# Patient Record
Sex: FEMALE | Race: BLACK OR AFRICAN AMERICAN
[De-identification: names, ages, dates, MRNs, and addresses within clinical notes are randomized per-mention and may not be internally consistent; named-entity substitution may affect disease eponyms.]

---

## 2020-07-02 ENCOUNTER — HOSPITAL ENCOUNTER (OUTPATIENT)
Dept: HOSPITAL 35 - ER | Age: 55
Setting detail: OBSERVATION
Discharge: HOME | End: 2020-07-02
Attending: INTERNAL MEDICINE | Admitting: INTERNAL MEDICINE
Payer: COMMERCIAL

## 2020-07-02 VITALS — DIASTOLIC BLOOD PRESSURE: 68 MMHG | SYSTOLIC BLOOD PRESSURE: 98 MMHG

## 2020-07-02 VITALS — SYSTOLIC BLOOD PRESSURE: 112 MMHG | DIASTOLIC BLOOD PRESSURE: 68 MMHG

## 2020-07-02 VITALS — SYSTOLIC BLOOD PRESSURE: 135 MMHG | DIASTOLIC BLOOD PRESSURE: 72 MMHG

## 2020-07-02 VITALS — SYSTOLIC BLOOD PRESSURE: 115 MMHG | DIASTOLIC BLOOD PRESSURE: 74 MMHG

## 2020-07-02 VITALS — SYSTOLIC BLOOD PRESSURE: 121 MMHG | DIASTOLIC BLOOD PRESSURE: 81 MMHG

## 2020-07-02 VITALS — SYSTOLIC BLOOD PRESSURE: 98 MMHG | DIASTOLIC BLOOD PRESSURE: 68 MMHG

## 2020-07-02 VITALS — BODY MASS INDEX: 38.27 KG/M2 | HEIGHT: 62.99 IN | WEIGHT: 216 LBS

## 2020-07-02 VITALS — SYSTOLIC BLOOD PRESSURE: 129 MMHG | DIASTOLIC BLOOD PRESSURE: 64 MMHG

## 2020-07-02 VITALS — DIASTOLIC BLOOD PRESSURE: 91 MMHG | SYSTOLIC BLOOD PRESSURE: 133 MMHG

## 2020-07-02 VITALS — DIASTOLIC BLOOD PRESSURE: 74 MMHG | SYSTOLIC BLOOD PRESSURE: 110 MMHG

## 2020-07-02 DIAGNOSIS — E78.00: ICD-10-CM

## 2020-07-02 DIAGNOSIS — E78.5: ICD-10-CM

## 2020-07-02 DIAGNOSIS — I10: ICD-10-CM

## 2020-07-02 DIAGNOSIS — I21.4: Primary | ICD-10-CM

## 2020-07-02 DIAGNOSIS — J45.909: ICD-10-CM

## 2020-07-02 DIAGNOSIS — I25.110: ICD-10-CM

## 2020-07-02 DIAGNOSIS — F17.290: ICD-10-CM

## 2020-07-02 LAB
ANION GAP SERPL CALC-SCNC: 8 MMOL/L (ref 7–16)
BASOPHILS NFR BLD AUTO: 0.6 % (ref 0–2)
BUN SERPL-MCNC: 12 MG/DL (ref 7–18)
CALCIUM SERPL-MCNC: 9.2 MG/DL (ref 8.5–10.1)
CHLORIDE SERPL-SCNC: 104 MMOL/L (ref 98–107)
CHOLEST SERPL-MCNC: 100 MG/DL (ref ?–200)
CO2 SERPL-SCNC: 27 MMOL/L (ref 21–32)
CREAT SERPL-MCNC: 1.1 MG/DL (ref 0.6–1)
EOSINOPHIL NFR BLD: 15.3 % (ref 0–3)
ERYTHROCYTE [DISTWIDTH] IN BLOOD BY AUTOMATED COUNT: 14.2 % (ref 10.5–14.5)
GLUCOSE SERPL-MCNC: 99 MG/DL (ref 74–106)
GRANULOCYTES NFR BLD MANUAL: 50.9 % (ref 36–66)
HCT VFR BLD CALC: 39.1 % (ref 37–47)
HDLC SERPL-MCNC: 41 MG/DL (ref 40–?)
HGB BLD-MCNC: 13.4 GM/DL (ref 12–15)
LDLC SERPL-MCNC: 43 MG/DL (ref ?–100)
LYMPHOCYTES NFR BLD AUTO: 27.1 % (ref 24–44)
MCH RBC QN AUTO: 31.5 PG (ref 26–34)
MCHC RBC AUTO-ENTMCNC: 34.2 G/DL (ref 28–37)
MCV RBC: 92.1 FL (ref 80–100)
MONOCYTES NFR BLD: 6.1 % (ref 1–8)
NEUTROPHILS # BLD: 3.8 THOU/UL (ref 1.4–8.2)
PLATELET # BLD: 225 THOU/UL (ref 150–400)
POTASSIUM SERPL-SCNC: 3.2 MMOL/L (ref 3.5–5.1)
RBC # BLD AUTO: 4.25 MIL/UL (ref 4.2–5)
SODIUM SERPL-SCNC: 139 MMOL/L (ref 136–145)
TC:HDL: 2.4 RATIO
TRIGL SERPL-MCNC: 82 MG/DL (ref ?–150)
TROPONIN I SERPL-MCNC: <0.06 NG/ML (ref ?–0.06)
VLDLC SERPL CALC-MCNC: 16 MG/DL (ref ?–40)
WBC # BLD AUTO: 7.6 THOU/UL (ref 4–11)
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
## 2020-07-02 NOTE — NUR
ASSUMED PT CARE AT 1900, PT IS AWAKE, A&OX4, SR ON THE MONITOR, DENIES CHEST
PAIN, DULL PAIN ON THE GROIN SITE TO TOUCH, GROIN SITE WITH NO HEMATOMA,
PULSES 2+ BLE, AMBULATED ON THE HALLWAY, NO SOA, LIGHTHEADEDNESS OR HEADACHE,
PT WILL BE D/C HOME WITH SON, DISCHARGE EDUCATION GIVEN, PT STATES
UNDERSTANDING

## 2020-07-06 NOTE — CATHLAB
[facility]
[address]
[place]   [address]                   INVASIVE PROCEDURE REPORT     
_______________________________________________________________________________
 
Name:       DARRELL CUMMINGS           Room #:         212-P       [facility] Robby BERGER#:      [id number]                       Account #:      [id number]  
Admission:  07/02/20    Attend Phys:    Ishan Braun MD,
Discharge:  07/02/20    Date of Birth:  [birth year]  
                                                          Report #: [id number]
                                                                    [id number]
_______________________________________________________________________________
THIS REPORT FOR:  
 
cc:  Don Mercado MD MPH    
     Don Mercado MD MPH    
     Ishan Braun MD [facility]                                        
                                                                       ~
 
--------------- APPROVED REPORT --------------
 
 
Study performed:  07/02/2020 15:45:28
 
Patient Details
Patient Status: Out-Patient                  Room #: 
The patient is a 54 year-old female
 
Event Personnel
Ishan Braun  Interventional Cardiologist, Any Cote RN RN, 
Marysol Silva  Monitor, Shruthi Dupont RTR Scrub
 
Procedures Performed
Art Access - R femoral artery*  78252 Initial Mod Sed Same Phys/QHP 
Gr5y 841937 Left Heart Cath w/or w/o Coronaries [id number] [facility] Aortogram 
Abdominal Peripheral Angio 163711 Hemostasis w/ 
Mynx
 
Indication
Non-STEMI 
 
Procedure Narrative
The patient was brought urgently to the Cardiac Catheterization 
Laboratory and was prepped and draped in a sterile manner. The Right 
Groin^ was infiltrated with 1% Lidocaine subcutaneous anesthesia. A 
PINNACLE 6FR Sheath #[id number] sheath was inserted into the RFA^. 
Coronary angiography was performed using coronary diagnostic 
catheters. The right coronary system was accessed and visualized with 
a JR 4 catheter. The left coronary system was accessed and visualized 
with a JL 4 catheter. The left ventricle was accessed and visualized 
with a Pigtail catheter. Left ventriculogram was performed in TAN 
projection. An aortogram of the abdominal aorta was performed. 
Closure device was deployed with a 6 Fr Mynx. The patient tolerated 
the procedure well and there were no complications associated with 
the procedure. There was no hematoma.
 
Intraoperative Conscious Sedation
Sedation start time:  16:41           Case end Time:  
 
 
[facility]
[place]
[place]  [address]
Phone:  [phone]                    INVASIVE PROCEDURE REPORT     
_______________________________________________________________________________
 
Name:            DARRELL CUMMINGS           Room #:        212-P       [facility] [place]
M.R.#:           [id number]          Account #:     [id number]  
Admission:       07/02/20         Attend Phys:   Ishan Braun,
Discharge:    07/02/20      Date of Birth: [birth year]  
                         Report #:      [id number]
        [id number]
_______________________________________________________________________________
17:02    
Fentanyl  100 mcg    Versed  2 mg  
 
Fluoro Time:    1.30 minutes    
Dose:     DAP 3462.00 cGycm2  390 mGy  
Contrast Type and Amount:  Visipaque 105 ml   
 
Hemodynamics
The aortic pressure is 115/74 mmHg with a mean of 89 mmHg. 
 
Conclusion
1.  Left main free of disease giving rise to LAD and circumflex
#2 LAD extends around the apex no occlusive disease
#3 circumflex OM nondominant but moderate distribution no high-grade 
disease
#4 dominant right coronary no occlusive disease widely patent
#5 normal left ventricular size systolic function lower limits of 
normal EF 50% range
#6 abdominal aorta is normal in caliber no aneurysm.
 
Recommendations and plan: Continue aggressive risk factor 
modification.  I explained to the patient she is essentially normal 
coronary anatomy thus etiology of chest pain appears to be 
noncardiac.
 
 
 
 
 
 
 
 
 
 
 
 
 
 
 
 
 
 
 
 
  <ELECTRONICALLY SIGNED>
   By: [facility]an [facility], [facility]   
  07/06/20 1826
D: 07/06/20 1826                           _____________________________________
T: 07/06/20 1826                           Ishna Braun MD, FACC     /INF

## 2020-07-06 NOTE — EKG
[facility]
[address]
[place]   [address]                     ELECTROCARDIOGRAM REPORT      
_______________________________________________________________________________
 
Name:       DARRELL CUMMINGS           Room #:         [facility] 
M.R.#:      [id number]                       Account #:      [id number]  
Admission:  20    Attend Phys:    Ishan Braun MD,
Discharge:  20    Date of Birth:  [birth year]  
                                                          Report #: [id number]
                                                                    [id number]
_______________________________________________________________________________
THIS REPORT FOR:  
 
cc:  Don Mercado MD MPH    
     Don Mercado MD MPH    
     Inocente Antonio MD [facility]                                        ~
THIS REPORT FOR:   //name//                          
 
                         [facility] ED
                                       
Test Date:    2020               Test Time:    13:18:35
Pat Name:     DARRELL LAZAR      Department:   
Patient ID:   [id number]            Room:         [facility]
Gender:       F                        Technician:   ESHEETS
:          1965               Requested By: Mick Salazar
Order Number: [id number] MD:   Inocente Antonio
                                 Measurements
Intervals                              Axis          
Rate:         60                       P:            35
MS:           179                      QRS:          -23
QRSD:         102                      T:            1
QT:           440                                    
QTc:          440                                    
                           Interpretive Statements
Sinus rhythm
Left ventricular hypertrophy
Borderline T abnormalities, anterior leads
Baseline wander in lead(s) V3
No previous ECG available for comparison
Electronically Signed On 2020 7:25:04 CDT by Inocente Antonio
[email or web address]
 
 
 
 
 
 
 
 
 
 
 
 
 
 
  <ELECTRONICALLY SIGNED>
   By: Inocente Antonio MD, [facility]   
  20     0725
D: 20 1318                           _____________________________________
T: 20 1318                           Inocente Antonio MD, [facility]     /EPI